# Patient Record
(demographics unavailable — no encounter records)

---

## 2024-11-25 NOTE — HISTORY OF PRESENT ILLNESS
[N] : Patient does not use contraception [Y] : Positive pregnancy history [PapSmeardate] : 11/20/2023 [TextBox_31] : Neg- [HPVDate] : 11/20/2023 [TextBox_78] : Neg- [LMPDate] : 11/22/24 [PGxTotal] : 1 [PGHxABInduced] : 1 [Menarche Age: ____] : age at menarche was [unfilled] [FreeTextEntry1] : 11/22/24 [No] : Patient does not have concerns regarding sex [Currently Active] : currently active [Men] : men [FreeTextEntry2] :